# Patient Record
Sex: FEMALE | Race: WHITE | Employment: PART TIME | URBAN - METROPOLITAN AREA
[De-identification: names, ages, dates, MRNs, and addresses within clinical notes are randomized per-mention and may not be internally consistent; named-entity substitution may affect disease eponyms.]

---

## 2017-11-17 ENCOUNTER — APPOINTMENT (OUTPATIENT)
Dept: GENERAL RADIOLOGY | Age: 57
End: 2017-11-17
Attending: PHYSICIAN ASSISTANT
Payer: OTHER GOVERNMENT

## 2017-11-17 ENCOUNTER — HOSPITAL ENCOUNTER (EMERGENCY)
Age: 57
Discharge: HOME | End: 2017-11-17
Attending: EMERGENCY MEDICINE
Payer: OTHER GOVERNMENT

## 2017-11-17 VITALS
OXYGEN SATURATION: 100 % | TEMPERATURE: 97.6 F | HEIGHT: 68 IN | BODY MASS INDEX: 27.58 KG/M2 | RESPIRATION RATE: 18 BRPM | DIASTOLIC BLOOD PRESSURE: 76 MMHG | HEART RATE: 83 BPM | SYSTOLIC BLOOD PRESSURE: 157 MMHG | WEIGHT: 182 LBS

## 2017-11-17 DIAGNOSIS — S22.32XA CLOSED FRACTURE OF ONE RIB OF LEFT SIDE, INITIAL ENCOUNTER: Primary | ICD-10-CM

## 2017-11-17 PROCEDURE — 71101 X-RAY EXAM UNILAT RIBS/CHEST: CPT

## 2017-11-17 PROCEDURE — 99284 EMERGENCY DEPT VISIT MOD MDM: CPT

## 2017-11-17 RX ORDER — ZOLPIDEM TARTRATE 5 MG/1
5 TABLET ORAL
COMMUNITY

## 2017-11-17 RX ORDER — TRAZODONE HYDROCHLORIDE 50 MG/1
TABLET ORAL
COMMUNITY

## 2017-11-17 RX ORDER — QUETIAPINE FUMARATE 50 MG/1
50 TABLET, FILM COATED ORAL 2 TIMES DAILY
COMMUNITY

## 2017-11-17 RX ORDER — HYDROCODONE BITARTRATE AND ACETAMINOPHEN 5; 325 MG/1; MG/1
1 TABLET ORAL
Qty: 20 TAB | Refills: 0 | Status: SHIPPED | OUTPATIENT
Start: 2017-11-17

## 2017-11-17 NOTE — ED NOTES
Discharge instructions reviewed with the patient with opportunity for questions given. The patient verbalized understanding. Patient armband removed and shredded. Patient in stable condition. Patient decline wheelchair.

## 2017-11-17 NOTE — ED TRIAGE NOTES
Pt reports that around 0900 this morning she fell down approximately 6 steps onto wood floor. Pt currently reporting left rib and left back pain. Pt denies LOC/ head trauma or blood thinners.

## 2017-11-17 NOTE — CALL BACK NOTE
Xray discrepancy showing nondisplaced 9-11 rib fractures. Dx of 9th, possible 10th nondisplaced rib fxs s/p fall. No flail chest. Pt drove to and from ED and declined pain medication in ED; rx'e [facility]. Attempted to call pt regarding discrepancy, did not answer and voicemail box was full.

## 2017-11-17 NOTE — CALL BACK NOTE
Patient called back. X-ray results discussed. Pain control and return precautions discussed. Has PCP appt on Monday.

## 2017-11-17 NOTE — ED PROVIDER NOTES
[address]  EMERGENCY DEPARTMENT HISTORY AND PHYSICAL EXAM       Date: 2017   Patient Name: Shadi Lowe   YOB: 1960  Medical Record Number: [id number]    History of Presenting Illness     Chief Complaint   Patient presents with    Rib Pain        History Provided By:  patient    Additional History: 10:51 AM   Shadi Lowe is a 56 y.o. female who presents to the emergency department C/O left rib pain onset 2 hours ago. Associated sxs include left back pain. Reports she slipped and fell down approximately 6 wooden stairs. Pt reports she talked to the executive nurse who told her to put ice on it and to come here to the ED. Pt states she took some Ibuprofen with no relief. No LOC or head trauma. Pshx of cervical fusion and  section. Denies use of blood thinners, gait problems, weakness, numbness, abdominal pain, neck pain and any other sxs or complaints. Primary Care Provider: Glory Carrillo MD   Specialist:    Past History     Past Medical History:   History reviewed. No pertinent past medical history. Past Surgical History:   Past Surgical History:   Procedure Laterality Date    HX CERVICAL FUSION      C3-C5 with titanium jama placement    HX  SECTION          Family History:   History reviewed. No pertinent family history. Social History:   Social History   Substance Use Topics    Smoking status: Current Every Day Smoker    Smokeless tobacco: Never Used      Comment: 3 cigarrettes daily    Alcohol use No        Allergies:   No Known Allergies     Review of Systems   Review of Systems   Gastrointestinal: Negative for abdominal pain. Musculoskeletal: Positive for back pain (left sided) and myalgias (left sided rib pain). Negative for gait problem and neck pain. Neurological: Negative for weakness and numbness. All other systems reviewed and are negative.       Physical Exam  Vitals:    17 1043 17 1232 BP: 132/67 157/76   Pulse: 85 83   Resp: 18 18   Temp: 97.6 °F (36.4 °C)    SpO2: 100% 100%   Weight: 82.6 kg (182 lb)    Height: 5' 8\" (1.727 m)        Physical Exam   Constitutional: She is oriented to person, place, and time. She appears well-developed and well-nourished. No distress. HENT:   Head: Normocephalic and atraumatic. Eyes: Conjunctivae are normal.   Neck: Normal range of motion. Neck supple. No spinous process tenderness and no muscular tenderness present. Normal range of motion present. Cardiovascular: Normal rate, regular rhythm and normal heart sounds. Pulmonary/Chest: Effort normal and breath sounds normal. She exhibits tenderness. Abdominal: Soft. She exhibits no distension and no mass. There is no tenderness. There is no rebound and no guarding. Musculoskeletal: Normal range of motion. Neurological: She is alert and oriented to person, place, and time. Skin: Skin is warm and dry. She is not diaphoretic. Psychiatric: She has a normal mood and affect. Her behavior is normal.   Nursing note and vitals reviewed. Diagnostic Study Results     Labs -    No results found for this or any previous visit (from the past 12 hour(s)). 12:14 PM  RADIOLOGY FINDINGS  Left rib X-ray shows Non displaced left 9th rib fracture, possible 10th rib fx. Pending review by Radiologist  Recorded by Kareem Weber ED Scribe, as dictated by [employer], [email or web address]       Medical Decision Making   I am the first provider for this patient. I reviewed the vital signs, available nursing notes, past medical history, past surgical history, family history and social history. Vital Signs-Reviewed the patient's vital signs. Patient Vitals for the past 12 hrs:   Temp Pulse Resp BP SpO2   11/17/17 1232 - 83 18 157/76 100 %   11/17/17 1043 97.6 °F (36.4 °C) 85 18 132/67 100 %       Pulse Oximetry Analysis - Normal 100% on RA     Old Medical Records: Nursing notes.      Procedures: Procedures    ED Course:  10:51 AM  Initial assessment performed. The patients presenting problems have been discussed, and they are in agreement with the care plan formulated and outlined with them. I have encouraged them to ask questions as they arise throughout their visit. Medications Given in the ED:  Medications - No data to display    Discharge Note:  12:20 PM  Pt has been reexamined. Patient has no new complaints, changes, or physical findings. Care plan outlined and precautions discussed. Results were reviewed with the patient. All medications were reviewed with the patient; will d/c home with new rx. All of pt's questions and concerns were addressed. Patient was instructed and agrees to follow up with PCP, as well as to return to the ED upon further deterioration. Patient is ready to go home. Diagnosis   Clinical Impression:   1. Closed fracture of one rib of left side, initial encounter           Follow-up Information     Follow up With Details Comments [address], [place] Schedule an appointment as soon as possible for a visit in 2 days For primary care follow up [address]  [phone]      [facility] EMERGENCY DEPT Go to As needed, if symptoms worsen [address] [address]  [phone]          Discharge Medication List as of 11/17/2017 12:21 PM      START taking these medications    Details   HYDROcodone-acetaminophen (NORCO) 5-325 mg per tablet Take 1 Tab by mouth every four (4) hours as needed for Pain.  Max Daily Amount: 6 Tabs., Print, Disp-20 Tab, R-0         CONTINUE these medications which have NOT CHANGED    Details   zolpidem (AMBIEN) 5 mg tablet Take 5 mg by mouth nightly as needed for Sleep., Historical Med      QUEtiapine (SEROQUEL) 50 mg tablet Take 50 mg by mouth two (2) times a day., Historical Med      ACETAMINOPHEN/DIPHENHYDRAMINE (TYLENOL PM PO) Take  by mouth., Historical Med traZODone (DESYREL) 50 mg tablet Take  by mouth nightly., Historical Med             _______________________________   Attestations: This note is prepared by Jesse Alejo, acting as a Scribe for [employer] on 10:48 AM on 11/17/2017 . [employer]: The scribe's documentation has been prepared under my direction and personally reviewed by me in its entirety.   _______________________________

## 2017-11-17 NOTE — DISCHARGE INSTRUCTIONS
Broken Rib: Care Instructions  Your Care Instructions    A broken rib is a crack or break in one of the bones of the rib cage. Breathing can be very painful because the muscles used for breathing pull on the rib. In most cases, a broken rib will heal on its own. You can take pain medicine while the rib mends. Pain relief allows you to take deep breaths. In the past, doctors recommended taping or wrapping broken ribs. This is no longer done because taping makes it hard for you to take deep breaths. Taking deep breaths may help prevent pneumonia or a partial collapse of a lung. Your rib will heal in about 6 weeks. You heal best when you take good care of yourself. Eat a variety of healthy foods, and don't smoke. Follow-up care is a key part of your treatment and safety. Be sure to make and go to all appointments, and call your doctor if you are having problems. It's also a good idea to know your test results and keep a list of the medicines you take. How can you care for yourself at home? · Be safe with medicines. Read and follow all instructions on the label. ¨ If the doctor gave you a prescription medicine for pain, take it as prescribed. ¨ If you are not taking a prescription pain medicine, ask your doctor if you can take an over-the-counter medicine. · Even if it hurts, try to cough or take the deepest breath you can at least once every hour. This will get air deeply into your lungs. This may reduce your chance of getting pneumonia or a partial collapse of a lung. Hold a pillow against your chest to make this less painful. · Put ice or a cold pack on the area for 10 to 20 minutes at a time. Put a thin cloth between the ice and your skin. When should you call for help? Call 911 anytime you think you may need emergency care. For example, call if:  ? · You have severe trouble breathing. ?Call your doctor now or seek immediate medical care if:  ? · You have some trouble breathing.    ? · You have a fever.   ? · You have a new or worse cough. ? Watch closely for changes in your health, and be sure to contact your doctor if:  ? · You have pain even after taking your medicine. ? · You do not get better as expected. Where can you learn more? Go to http://say-steve.info/. Enter M135 in the search box to learn more about \"Broken Rib: Care Instructions. \"  Current as of: March 21, 2017  Content Version: 11.4  © 2063-3302 [employer]. Care instructions adapted under license by [employer] (which disclaims liability or warranty for this information). If you have questions about a medical condition or this instruction, always ask your healthcare professional. [address] any warranty or liability for your use of this information.